# Patient Record
Sex: MALE | Race: WHITE | NOT HISPANIC OR LATINO | URBAN - METROPOLITAN AREA
[De-identification: names, ages, dates, MRNs, and addresses within clinical notes are randomized per-mention and may not be internally consistent; named-entity substitution may affect disease eponyms.]

---

## 2019-02-01 ENCOUNTER — EMERGENCY (EMERGENCY)
Facility: HOSPITAL | Age: 24
LOS: 1 days | Discharge: HOME | End: 2019-02-01
Attending: EMERGENCY MEDICINE
Payer: OTHER GOVERNMENT

## 2019-02-01 VITALS
HEART RATE: 82 BPM | TEMPERATURE: 98 F | WEIGHT: 210.1 LBS | RESPIRATION RATE: 16 BRPM | SYSTOLIC BLOOD PRESSURE: 138 MMHG | DIASTOLIC BLOOD PRESSURE: 76 MMHG | OXYGEN SATURATION: 98 % | HEIGHT: 70 IN

## 2019-02-01 PROCEDURE — 99282 EMERGENCY DEPT VISIT SF MDM: CPT | Mod: 25

## 2019-02-01 PROCEDURE — 94640 AIRWAY INHALATION TREATMENT: CPT

## 2019-02-01 PROCEDURE — 99282 EMERGENCY DEPT VISIT SF MDM: CPT

## 2019-02-01 RX ORDER — FLUTICASONE PROPIONATE 50 MCG
1 SPRAY, SUSPENSION NASAL ONCE
Qty: 0 | Refills: 0 | Status: COMPLETED | OUTPATIENT
Start: 2019-02-01 | End: 2019-02-01

## 2019-02-01 RX ADMIN — Medication 1 SPRAY(S): at 23:19

## 2019-02-01 NOTE — ED PROVIDER NOTE - ATTENDING CONTRIBUTION TO CARE
Attending MD Lamb:  I personally have seen and examined this patient.  NP note reviewed and agree on plan of care and except where noted.  See HPI, PE, and MDM for details.

## 2019-02-01 NOTE — ED PROVIDER NOTE - CARE PROVIDER_API CALL
Quincy Alfred)  Otolaryngology  [address] [place] [address], [address]  [place] [address]  Phone: [phone]  Fax: [phone]

## 2019-02-01 NOTE — ED PROVIDER NOTE - PHYSICAL EXAMINATION
Attending MD Lamb:    Gen:  well appearing, oriented x 3  Neck: supple, no swelling, trachea midline  Tms with good cone of light b/l, no erythema, R Tm with perhaps small effusion   Resp: breathing comfortably  Pysch: appropriate affect    Neuro: moves all extremities spontaneously, no gross motor or sensory deficits

## 2019-02-01 NOTE — ED PROVIDER NOTE - OBJECTIVE STATEMENT
22yo male pt, no PMHx, ambulatory c/o right ear pain after using Q-tip today. He noticed the Q-tip was broken and concerned about Q-tip in left ear. Denies other injuries. Denies hearing problems. Denies bleeding from left ear. Denies fever, chills or recent sickness. Denies sensory changes or weakness to extremities.

## 2019-02-01 NOTE — ED PROVIDER NOTE - MEDICAL DECISION MAKING DETAILS
Attending MD Lamb: 23M with right ear pain after using a Qtip, pt concerned about Q tip left in ear canal. Exam shows no foreign body in right ear canal, maybe small middle ear effusion. Plan for flonase for nasal decongestion

## 2019-02-01 NOTE — ED PROVIDER NOTE - NSFOLLOWUPINSTRUCTIONS_ED_ALL_ED_FT
Flonase nasal spray to both nostrils daily.  Take Tylenol 500mg or 650mg every 6hours for pain as needed.  Follow up with ENT dr. Alfred if the pain consists.  Follow up with your primary Dr. for reevaluation in 2-3days.  Return for any concerns or worsening symptoms.

## 2019-08-27 NOTE — ED ADULT NURSE NOTE - NS_NURSE_DISC_TEACHING_YN_ED_ALL_ED
Problem: Falls - Risk of:  Goal: Will remain free from falls  Description  Will remain free from falls  Outcome: Met This Shift  Goal: Absence of physical injury  Description  Absence of physical injury  Outcome: Met This Shift No
